# Patient Record
Sex: MALE | Race: WHITE | HISPANIC OR LATINO | Employment: FULL TIME | URBAN - METROPOLITAN AREA
[De-identification: names, ages, dates, MRNs, and addresses within clinical notes are randomized per-mention and may not be internally consistent; named-entity substitution may affect disease eponyms.]

---

## 2024-04-12 ENCOUNTER — APPOINTMENT (OUTPATIENT)
Dept: RADIOLOGY | Facility: MEDICAL CENTER | Age: 64
End: 2024-04-12
Attending: EMERGENCY MEDICINE
Payer: COMMERCIAL

## 2024-04-12 ENCOUNTER — HOSPITAL ENCOUNTER (OUTPATIENT)
Facility: MEDICAL CENTER | Age: 64
End: 2024-04-14
Attending: EMERGENCY MEDICINE | Admitting: INTERNAL MEDICINE
Payer: COMMERCIAL

## 2024-04-12 DIAGNOSIS — R41.0 DISORIENTATION: ICD-10-CM

## 2024-04-12 DIAGNOSIS — I10 UNCONTROLLED HYPERTENSION: ICD-10-CM

## 2024-04-12 DIAGNOSIS — I16.0 HYPERTENSIVE URGENCY: ICD-10-CM

## 2024-04-12 PROBLEM — R41.82 ALTERED MENTAL STATUS: Status: ACTIVE | Noted: 2024-04-12

## 2024-04-12 LAB
ALBUMIN SERPL BCP-MCNC: 4.8 G/DL (ref 3.2–4.9)
ALBUMIN/GLOB SERPL: 1.6 G/DL
ALP SERPL-CCNC: 91 U/L (ref 30–99)
ALT SERPL-CCNC: 23 U/L (ref 2–50)
AMMONIA PLAS-SCNC: 25 UMOL/L (ref 11–45)
ANION GAP SERPL CALC-SCNC: 16 MMOL/L (ref 7–16)
APPEARANCE UR: CLEAR
AST SERPL-CCNC: 32 U/L (ref 12–45)
BASOPHILS # BLD AUTO: 0.7 % (ref 0–1.8)
BASOPHILS # BLD: 0.08 K/UL (ref 0–0.12)
BILIRUB SERPL-MCNC: 0.7 MG/DL (ref 0.1–1.5)
BILIRUB UR QL STRIP.AUTO: NEGATIVE
BUN SERPL-MCNC: 10 MG/DL (ref 8–22)
CALCIUM ALBUM COR SERPL-MCNC: 8.7 MG/DL (ref 8.5–10.5)
CALCIUM SERPL-MCNC: 9.3 MG/DL (ref 8.5–10.5)
CHLORIDE SERPL-SCNC: 100 MMOL/L (ref 96–112)
CO2 SERPL-SCNC: 19 MMOL/L (ref 20–33)
COLOR UR: YELLOW
CREAT SERPL-MCNC: 0.9 MG/DL (ref 0.5–1.4)
EOSINOPHIL # BLD AUTO: 0.14 K/UL (ref 0–0.51)
EOSINOPHIL NFR BLD: 1.2 % (ref 0–6.9)
ERYTHROCYTE [DISTWIDTH] IN BLOOD BY AUTOMATED COUNT: 41.3 FL (ref 35.9–50)
ETHANOL BLD-MCNC: <10.1 MG/DL
ETHANOL BLD-MCNC: <10.1 MG/DL
GFR SERPLBLD CREATININE-BSD FMLA CKD-EPI: 95 ML/MIN/1.73 M 2
GLOBULIN SER CALC-MCNC: 3 G/DL (ref 1.9–3.5)
GLUCOSE BLD STRIP.AUTO-MCNC: 104 MG/DL (ref 65–99)
GLUCOSE SERPL-MCNC: 104 MG/DL (ref 65–99)
GLUCOSE UR STRIP.AUTO-MCNC: >=1000 MG/DL
HCT VFR BLD AUTO: 52.8 % (ref 42–52)
HGB BLD-MCNC: 18.1 G/DL (ref 14–18)
IMM GRANULOCYTES # BLD AUTO: 0.08 K/UL (ref 0–0.11)
IMM GRANULOCYTES NFR BLD AUTO: 0.7 % (ref 0–0.9)
KETONES UR STRIP.AUTO-MCNC: ABNORMAL MG/DL
LACTATE SERPL-SCNC: 2.4 MMOL/L (ref 0.5–2)
LEUKOCYTE ESTERASE UR QL STRIP.AUTO: NEGATIVE
LYMPHOCYTES # BLD AUTO: 2.75 K/UL (ref 1–4.8)
LYMPHOCYTES NFR BLD: 24.5 % (ref 22–41)
MCH RBC QN AUTO: 30.7 PG (ref 27–33)
MCHC RBC AUTO-ENTMCNC: 34.3 G/DL (ref 32.3–36.5)
MCV RBC AUTO: 89.6 FL (ref 81.4–97.8)
MICRO URNS: ABNORMAL
MONOCYTES # BLD AUTO: 0.67 K/UL (ref 0–0.85)
MONOCYTES NFR BLD AUTO: 6 % (ref 0–13.4)
NEUTROPHILS # BLD AUTO: 7.5 K/UL (ref 1.82–7.42)
NEUTROPHILS NFR BLD: 66.9 % (ref 44–72)
NITRITE UR QL STRIP.AUTO: NEGATIVE
NRBC # BLD AUTO: 0 K/UL
NRBC BLD-RTO: 0 /100 WBC (ref 0–0.2)
PH UR STRIP.AUTO: 6.5 [PH] (ref 5–8)
PLATELET # BLD AUTO: 246 K/UL (ref 164–446)
PMV BLD AUTO: 10 FL (ref 9–12.9)
POTASSIUM SERPL-SCNC: 4.3 MMOL/L (ref 3.6–5.5)
PROT SERPL-MCNC: 7.8 G/DL (ref 6–8.2)
PROT UR QL STRIP: NEGATIVE MG/DL
RBC # BLD AUTO: 5.89 M/UL (ref 4.7–6.1)
RBC UR QL AUTO: NEGATIVE
SODIUM SERPL-SCNC: 135 MMOL/L (ref 135–145)
SP GR UR STRIP.AUTO: 1.02
TROPONIN T SERPL-MCNC: 9 NG/L (ref 6–19)
TSH SERPL DL<=0.005 MIU/L-ACNC: 0.53 UIU/ML (ref 0.38–5.33)
UROBILINOGEN UR STRIP.AUTO-MCNC: 0.2 MG/DL
WBC # BLD AUTO: 11.2 K/UL (ref 4.8–10.8)

## 2024-04-12 PROCEDURE — 82962 GLUCOSE BLOOD TEST: CPT

## 2024-04-12 PROCEDURE — 83605 ASSAY OF LACTIC ACID: CPT

## 2024-04-12 PROCEDURE — 85025 COMPLETE CBC W/AUTO DIFF WBC: CPT

## 2024-04-12 PROCEDURE — 81003 URINALYSIS AUTO W/O SCOPE: CPT

## 2024-04-12 PROCEDURE — 71045 X-RAY EXAM CHEST 1 VIEW: CPT

## 2024-04-12 PROCEDURE — 99285 EMERGENCY DEPT VISIT HI MDM: CPT

## 2024-04-12 PROCEDURE — G0378 HOSPITAL OBSERVATION PER HR: HCPCS

## 2024-04-12 PROCEDURE — 82140 ASSAY OF AMMONIA: CPT

## 2024-04-12 PROCEDURE — 84484 ASSAY OF TROPONIN QUANT: CPT

## 2024-04-12 PROCEDURE — 36415 COLL VENOUS BLD VENIPUNCTURE: CPT

## 2024-04-12 PROCEDURE — 82077 ASSAY SPEC XCP UR&BREATH IA: CPT

## 2024-04-12 PROCEDURE — 80053 COMPREHEN METABOLIC PANEL: CPT

## 2024-04-12 PROCEDURE — 84443 ASSAY THYROID STIM HORMONE: CPT

## 2024-04-12 PROCEDURE — 70450 CT HEAD/BRAIN W/O DYE: CPT

## 2024-04-12 RX ORDER — ATORVASTATIN CALCIUM 40 MG/1
80 TABLET, FILM COATED ORAL DAILY
Status: DISCONTINUED | OUTPATIENT
Start: 2024-04-13 | End: 2024-04-14 | Stop reason: HOSPADM

## 2024-04-12 RX ORDER — TELMISARTAN 40 MG/1
20 TABLET ORAL
Status: DISCONTINUED | OUTPATIENT
Start: 2024-04-13 | End: 2024-04-13

## 2024-04-12 RX ORDER — HYDRALAZINE HYDROCHLORIDE 20 MG/ML
10 INJECTION INTRAMUSCULAR; INTRAVENOUS EVERY 6 HOURS PRN
Status: DISCONTINUED | OUTPATIENT
Start: 2024-04-12 | End: 2024-04-14 | Stop reason: HOSPADM

## 2024-04-12 ASSESSMENT — PATIENT HEALTH QUESTIONNAIRE - PHQ9
1. LITTLE INTEREST OR PLEASURE IN DOING THINGS: NOT AT ALL
2. FEELING DOWN, DEPRESSED, IRRITABLE, OR HOPELESS: NOT AT ALL
SUM OF ALL RESPONSES TO PHQ9 QUESTIONS 1 AND 2: 0

## 2024-04-12 ASSESSMENT — LIFESTYLE VARIABLES
TOTAL SCORE: 0
ALCOHOL_USE: NO
TOTAL SCORE: 0
CONSUMPTION TOTAL: NEGATIVE
EVER FELT BAD OR GUILTY ABOUT YOUR DRINKING: NO
AVERAGE NUMBER OF DAYS PER WEEK YOU HAVE A DRINK CONTAINING ALCOHOL: 0
ON A TYPICAL DAY WHEN YOU DRINK ALCOHOL HOW MANY DRINKS DO YOU HAVE: 0
TOTAL SCORE: 0
HOW MANY TIMES IN THE PAST YEAR HAVE YOU HAD 5 OR MORE DRINKS IN A DAY: 0
DOES PATIENT WANT TO STOP DRINKING: CANNOT ASSESS
EVER HAD A DRINK FIRST THING IN THE MORNING TO STEADY YOUR NERVES TO GET RID OF A HANGOVER: NO
HAVE YOU EVER FELT YOU SHOULD CUT DOWN ON YOUR DRINKING: NO
HAVE PEOPLE ANNOYED YOU BY CRITICIZING YOUR DRINKING: NO

## 2024-04-12 NOTE — ED TRIAGE NOTES
.  Chief Complaint   Patient presents with    Hypertension     Intermittent ha    Memory Loss     Pt A&O x 2     Ambulated to triage with s/o. Per wife pt was acting odd when she called him at 1237.  when wife left for work.  At triage pt is Alert oriented x person place. FSBS 104. Denies n/t no slurred speech flor+/=  Charge rn notified stroke assessment paged.

## 2024-04-12 NOTE — ED PROVIDER NOTES
ED Provider Note    Primary care provider: Eleuterio Rivas M.D.    CHIEF COMPLAINT  Chief Complaint   Patient presents with    Hypertension     Intermittent ha    Memory Loss     Pt A&O x 2       Additional history obtained from: Wife states that she last saw him normal at around 730 this morning, she went to work, she came back from work and noted at around 1230 he was confused.  She states that there was a  and carpet shampoo all over the bedroom as if he was going to clean the carpet but he does not recall this.  Limitation to History:  Select: Altered mental status / Confusion    HPI  Jeremias Carpenter is a 63 y.o. male who presents to the Emergency Department for acute confusion.  I was asked to evaluate the patient at the nurses station as he came in [facility] and they were concerned about CVA.  The patient can tell me that he is in [place], he knows his name and situation but cannot give the date, can give me the year.  Does not recall any events earlier today.  Denies any headache, neck pain, chest pain, denies any alcohol use, denies any new numbness, focal weakness or vision changes.      External Record Review: Patient was hospitalized in October for a carotid endarterectomy.  Patient followed up in the vascular surgery clinic in November was doing well after surgery.  Is on Farxiga 10 mg daily as well as Plavix.    REVIEW OF SYSTEMS  See HPI.     PAST MEDICAL HISTORY   has a past medical history of Hypertension.    SURGICAL HISTORY   has a past surgical history that includes thromboendartectmy neck,neck incis (Right, 10/25/2023).    SOCIAL HISTORY  Social History     Tobacco Use    Smoking status: Former     Types: Cigarettes     Start date: 2003     Quit date: 1999     Years since quittin.2    Smokeless tobacco: Never   Substance Use Topics    Alcohol use: Yes     Alcohol/week: 2.4 oz     Types: 4 Glasses of wine per week      Social History     Substance and Sexual Activity    Drug Use Not on file       FAMILY HISTORY  No family history on file.    CURRENT MEDICATIONS  Reviewed.  See Encounter Summary.     ALLERGIES  No Known Allergies    PHYSICAL EXAM  VITAL SIGNS: BP (!) 159/94   Pulse 95   Temp 37.1 °C (98.8 °F) (Temporal)   Resp 16   Wt 95.3 kg (210 lb 1.6 oz)   SpO2 98%   BMI 33.91 kg/m²   Constitutional: Awake, alert in no apparent distress.  HENT: Normocephalic, Bilateral external ears normal. Nose normal.   Eyes: Conjunctiva normal, non-icteric, EOMI.    Thorax & Lungs: Easy unlabored respirations, Clear to ascultation bilaterally.  Cardiovascular: Regular rate, Regular rhythm, No murmurs, rubs or gallops. Bilateral pulses symmetrical.   Abdomen:  Soft, nontender, nondistended, normal active bowel sounds.   :    Skin: Visualized skin is  Dry, No erythema, No rash.   Musculoskeletal:   No cyanosis, clubbing or edema. No leg asymmetry.   Neurologic: Alert, Grossly non-focal.  Cranial nerves II through XII intact, normal speech, finger-nose intact, no drift, no dysmetria.  The patient does have horizontal nystagmus   Psychiatric: Normal affect, Normal mood  Lymphatic:      Radiologist interpretation:   DX-CHEST-PORTABLE (1 VIEW)   Final Result      No acute cardiopulmonary disease evident.      CT-HEAD W/O   Final Result      1.  Head CT without contrast within normal limits. No evidence of acute cerebral infarction, hemorrhage or mass lesion.   2.  Old right orbital floor blowout fracture.             COURSE & MEDICAL DECISION MAKING  Pertinent Labs & Imaging studies reviewed. (See chart for details)    COURSE & MEDICAL DECISION MAKING  Pertinent Labs & Imaging studies reviewed. (See chart for details)    Differential diagnoses include but are not limited to: Concerning for TIA/CVA other consideration would be intoxication.    2:02 PM - Nursing notes reviewed, patient seen and examined at bedside.    3 PM patient still not at baseline, does not recall her prior  interactions.    3:45 PM patient stillConfused, family at bedside, will discuss with the hospitalist regarding admission.    Discussion of management with other medical personnel: Hospitalist, Dr. Kwong    Escalation of care considered, and ultimately not performed: Considered TNK.    Decision tools and prescription drugs considered including, but not limited to: NIH stroke scale 1    Decision Making:  This is a pleasant 63 y.o. year old male who presents with confusion.  Apparently this was fairly abrupt in onset, last known well around 730 this morning, the wife came home at noon to find him altered.  His NIH stroke scale is quite low at this time, his only deficit is confusion.  He is not a tPA candidate given last known well is greater than 4 hours, I did not feel that he would be a stroke IR candidate as his symptoms are too mild and would not be concerning for a large vessel occlusion.  Therefore he underwent noncontrast head CT that is unremarkable.  Patient's blood pressure was quite elevated, he had a prior history of carotid endarterectomy, I am still concerned given that he is not back to baseline that he may have had a small CVA.    He was given blood pressure medications in the ER with modest control of his blood pressure, will allow somewhat permissive hypertension for now, patient will be hospitalized for further evaluation, likely MRI.    CRITICAL CARE  The very real possibilty of a deterioration of this patient's condition required the highest level of my preparedness for sudden, emergent intervention.  I provided critical care services, which included medication orders, frequent reevaluations of the patient's condition and response to treatment, ordering and reviewing test results, and discussing the case with various consultants.  The critical care time associated with the care of the patient was 30 minutes. Review chart for interventions. This time is exclusive of any other billable procedures.            FINAL IMPRESSION  1. Disorientation    2. Uncontrolled hypertension

## 2024-04-13 ENCOUNTER — APPOINTMENT (OUTPATIENT)
Dept: RADIOLOGY | Facility: MEDICAL CENTER | Age: 64
End: 2024-04-13
Attending: STUDENT IN AN ORGANIZED HEALTH CARE EDUCATION/TRAINING PROGRAM
Payer: COMMERCIAL

## 2024-04-13 ENCOUNTER — APPOINTMENT (OUTPATIENT)
Dept: RADIOLOGY | Facility: MEDICAL CENTER | Age: 64
End: 2024-04-13
Payer: COMMERCIAL

## 2024-04-13 PROBLEM — I10 ESSENTIAL HYPERTENSION: Status: ACTIVE | Noted: 2024-04-13

## 2024-04-13 PROBLEM — I16.0 HYPERTENSIVE URGENCY: Status: ACTIVE | Noted: 2024-04-13

## 2024-04-13 PROBLEM — E78.5 HYPERLIPIDEMIA: Status: ACTIVE | Noted: 2024-04-13

## 2024-04-13 PROBLEM — E66.9 OBESITY: Status: ACTIVE | Noted: 2024-04-13

## 2024-04-13 PROCEDURE — A9579 GAD-BASE MR CONTRAST NOS,1ML: HCPCS | Mod: UD

## 2024-04-13 PROCEDURE — 70551 MRI BRAIN STEM W/O DYE: CPT

## 2024-04-13 PROCEDURE — 700117 HCHG RX CONTRAST REV CODE 255: Mod: UD

## 2024-04-13 PROCEDURE — G0378 HOSPITAL OBSERVATION PER HR: HCPCS

## 2024-04-13 PROCEDURE — A9270 NON-COVERED ITEM OR SERVICE: HCPCS | Mod: UD | Performed by: STUDENT IN AN ORGANIZED HEALTH CARE EDUCATION/TRAINING PROGRAM

## 2024-04-13 PROCEDURE — 99223 1ST HOSP IP/OBS HIGH 75: CPT | Performed by: INTERNAL MEDICINE

## 2024-04-13 PROCEDURE — 70552 MRI BRAIN STEM W/DYE: CPT

## 2024-04-13 PROCEDURE — 700102 HCHG RX REV CODE 250 W/ 637 OVERRIDE(OP): Mod: UD | Performed by: STUDENT IN AN ORGANIZED HEALTH CARE EDUCATION/TRAINING PROGRAM

## 2024-04-13 RX ORDER — TELMISARTAN 20 MG/1
40 TABLET ORAL
Qty: 60 TABLET | Refills: 0 | Status: SHIPPED | OUTPATIENT
Start: 2024-04-13 | End: 2024-04-14

## 2024-04-13 RX ORDER — TELMISARTAN 40 MG/1
40 TABLET ORAL
Status: DISCONTINUED | OUTPATIENT
Start: 2024-04-14 | End: 2024-04-14 | Stop reason: HOSPADM

## 2024-04-13 RX ADMIN — ATORVASTATIN CALCIUM 80 MG: 80 TABLET, FILM COATED ORAL at 05:52

## 2024-04-13 RX ADMIN — TELMISARTAN 20 MG: 40 TABLET ORAL at 05:52

## 2024-04-13 RX ADMIN — GADOTERIDOL 18 ML: 279.3 INJECTION, SOLUTION INTRAVENOUS at 21:55

## 2024-04-13 ASSESSMENT — FIBROSIS 4 INDEX: FIB4 SCORE: 1.71

## 2024-04-13 ASSESSMENT — PAIN DESCRIPTION - PAIN TYPE: TYPE: ACUTE PAIN

## 2024-04-13 NOTE — ED NOTES
Bedside report received from off going RN/tech: teagan OSCAR, assumed care of patient.  POC discussed with patient. Call light within reach, all needs addressed at this time.       Fall risk interventions in place: Move the patient closer to the nurse's station, Patient's personal possessions are with in their safe reach, and Keep floor surfaces clean and dry (all applicable per [place] Fall risk assessment)   Continuous monitoring: Not Applicable   IVF/IV medications: Not Applicable   Oxygen: Room Air  Bedside sitter: Not Applicable   Isolation: Not Applicable

## 2024-04-13 NOTE — CARE PLAN
The patient is Stable - Low risk of patient condition declining or worsening    Shift Goals  Clinical Goals: Brain MRI, q4h neuro checks will remain stable  Patient Goals: Rest  Family Goals: No family currently present    Progress made toward(s) clinical / shift goals:    Q4h neuro checks remain stable, no deficits noted.  Problem: Knowledge Deficit - Standard  Goal: Patient and family/care givers will demonstrate understanding of plan of care, disease process/condition, diagnostic tests and medications  Outcome: Progressing  Patient updated on POC for brain MRI today. Per MRI tech, time has yet to be determined. MRI screening complete. Patient's wife updated via phone.

## 2024-04-13 NOTE — H&P
Hospital Medicine History & Physical Note    Date of Service  4/13/2024    Primary Care Physician  Eleuterio Rivas M.D.    Consultants  None    Code Status  Full Code    Chief Complaint  Chief Complaint   Patient presents with    Hypertension     Intermittent ha    Memory Loss     Pt A&O x 2       History of Presenting Illness  Jeremias Carpenter is a 63 y.o. male with hypertension, history of carotid artery stenosis s/p endarterectomy (October 2023), who presents with acute confusion.  He was last seen normal by his wife in the morning before she went to work, and found him confused at home with note of  and carpet shampoo all over the bedroom as if he was going to clean the carpet although patient could not recall this.  Patient could not recall any events earlier in the day, but later on started to remember that he dropped his kids in school and when he came back home he was feeling weird.  He had no other complaints such as focal weakness, numbness, speech or vision changes.  He denies any chest pain, shortness of breath, nausea, vomiting, abdominal pain.  He was then brought to the ED.    Notably, he states that his blood pressure has been running high to the 150s and had to take extra dose of his telmisartan.  He was changed from a different blood pressure medications about 4 to 5 months ago by his PCP.     ED course:  On evaluation, his blood pressure was significantly elevated at 189/124.  Head CT showed nothing acute, with note of old right orbital floor blowout fracture.  Initial blood workup showed WBC of 11,200, hemoglobin of 18.1, normal platelet count, normal electrolytes and renal function, and normal LFTs.  Ammonia level was normal.  Lactate was 2.4.  Blood alcohol level was low.  Troponin was negative.  Urinalysis was clean.  TSH was normal.  Chest x-ray showed nothing acute (personally reviewed).  Patient subsequently admitted to the hospitalist service.    I personally  reviewed all lab results mentioned above. Prior medical records from this institution and outside facilities were independently reviewed as noted. I also personally reviewed all ER physician and consultant recommendations and plans as documented above. History was independently obtained by myself. I discussed the plan of care with patient, family, bedside RN, charge RN, , and ERP .    Review of Systems  ROS    Pertinent positives/negatives as mentioned above.     A complete review of systems was personally done by me. All other systems were negative.       Past Medical History   has a past medical history of Hypertension.    Surgical History   has a past surgical history that includes pr thromboendartectmy neck,neck incis (Right, 10/25/2023).     Family History  Family history reviewed with patient. There is no family history that is pertinent to the chief complaint.     Social History   reports that he quit smoking about 25 years ago. His smoking use included cigarettes. He started smoking about 21 years ago. He has never used smokeless tobacco. He reports current alcohol use of about 2.4 oz of alcohol per week.    Allergies  No Known Allergies    Medications  Prior to Admission Medications   Prescriptions Last Dose Informant Patient Reported? Taking?   Ascorbic Acid (VITAMIN C PO) unk at unk  Yes No   Sig: Take 1 Tablet by mouth every day.   FARXIGA 10 MG Tab unk at unk  Yes No   Sig: Take 10 mg by mouth every day.   MILK THISTLE PO unk at unk  Yes No   Sig: Take 1 Tablet by mouth every day.   Omega-3 Fatty Acids (OMEGA 3 PO) unk at unk  Yes No   Sig: Take 1 Capsule by mouth every day.   VITAMIN D PO unk at unk  Yes No   Sig: Take 1 Tablet by mouth every day.   atorvastatin (LIPITOR) 80 MG tablet unk at unk  Yes No   Sig: Take 80 mg by mouth every day.   telmisartan (MICARDIS) 20 MG tablet 4/12/2024 at unk  Yes Yes   Sig: Take 20 mg by mouth every day.      Facility-Administered Medications: None        Physical Exam  Temp:  [36.3 °C (97.3 °F)-37.1 °C (98.8 °F)] 36.3 °C (97.3 °F)  Pulse:  [] 70  Resp:  [16] 16  BP: (144-189)/() 145/88  SpO2:  [95 %-99 %] 96 %  Blood Pressure: (!) 145/88   Temperature: 36.3 °C (97.3 °F)   Pulse: 70   Respiration: 16   Pulse Oximetry: 96 %       Physical Exam  Vitals reviewed.   Constitutional:       General: He is not in acute distress.     Appearance: Normal appearance. He is obese. He is not toxic-appearing or diaphoretic.      Comments: Body mass index is 33.91 kg/m².   HENT:      Head: Normocephalic and atraumatic.      Right Ear: External ear normal.      Left Ear: External ear normal.      Mouth/Throat:      Mouth: Mucous membranes are moist.      Pharynx: No oropharyngeal exudate.   Eyes:      General: No scleral icterus.     Extraocular Movements: Extraocular movements intact.      Conjunctiva/sclera: Conjunctivae normal.      Pupils: Pupils are equal, round, and reactive to light.   Cardiovascular:      Rate and Rhythm: Normal rate and regular rhythm.      Heart sounds: Normal heart sounds. No murmur heard.     No gallop.   Pulmonary:      Effort: Pulmonary effort is normal. No respiratory distress.      Breath sounds: Normal breath sounds. No stridor. No wheezing, rhonchi or rales.   Chest:      Chest wall: No tenderness.   Abdominal:      General: Bowel sounds are normal. There is no distension.      Palpations: Abdomen is soft. There is no mass.      Tenderness: There is no abdominal tenderness. There is no guarding or rebound.   Musculoskeletal:         General: No swelling. Normal range of motion.      Cervical back: Normal range of motion and neck supple.      Right lower leg: No edema.      Left lower leg: No edema.   Lymphadenopathy:      Cervical: No cervical adenopathy.   Skin:     General: Skin is warm and dry.      Coloration: Skin is not jaundiced.      Findings: No rash.   Neurological:      General: No focal deficit present.      Mental  "Status: He is alert and oriented to person, place, and time.      Cranial Nerves: No cranial nerve deficit.   Psychiatric:         Mood and Affect: Mood normal.         Behavior: Behavior normal.         Thought Content: Thought content normal.         Judgment: Judgment normal.         Laboratory:  Recent Labs     04/12/24  1415   WBC 11.2*   RBC 5.89   HEMOGLOBIN 18.1*   HEMATOCRIT 52.8*   MCV 89.6   MCH 30.7   MCHC 34.3   RDW 41.3   PLATELETCT 246   MPV 10.0     Recent Labs     04/12/24  1630   SODIUM 135   POTASSIUM 4.3   CHLORIDE 100   CO2 19*   GLUCOSE 104*   BUN 10   CREATININE 0.90   CALCIUM 9.3     Recent Labs     04/12/24  1630   ALTSGPT 23   ASTSGOT 32   ALKPHOSPHAT 91   TBILIRUBIN 0.7   GLUCOSE 104*         No results for input(s): \"NTPROBNP\" in the last 72 hours.      Recent Labs     04/12/24  1630   TROPONINT 9       Imaging:  DX-CHEST-PORTABLE (1 VIEW)   Final Result      No acute cardiopulmonary disease evident.      CT-HEAD W/O   Final Result      1.  Head CT without contrast within normal limits. No evidence of acute cerebral infarction, hemorrhage or mass lesion.   2.  Old right orbital floor blowout fracture.         MR-BRAIN-W/O    (Results Pending)         Imaging studies and EKG results were independently reviewed as above.      Assessment/Plan:  Justification for Admission Status  I anticipate this patient is appropriate for observation status at this time because AMS/hypertensive urgency/emergency requiring further workup and optimization of blood pressure control.      * Altered mental status- (present on admission)  Assessment & Plan  - Suspect this is due to hypertensive urgency/emergency versus transient global amnesia.  No focal neurologic deficits.  -Admit to the CDU.  -Will further workup with brain MRI.  Neurochecks per protocol.  -Will control blood pressure with adjustment in blood pressure medications.  Start as needed IV hydralazine.  -Monitor on telemetry.    Hypertensive " urgency- (present on admission)  Assessment & Plan  - Likely cause of acute encephalopathy.  Blood pressure significantly elevated on presentation.  -Increase telmisartan to 40 mg daily. Monitor blood pressure trend closely, start as needed IV hydralazine for significant symptomatic hypertension.  Optimize blood pressure control.  -Will obtain brain MRI to rule out acute pathology.    Obesity- (present on admission)  Assessment & Plan  - Body mass index is 33.91 kg/m²..  -  on weight loss.  - outpatient referral for outpatient weight management.    Hyperlipidemia- (present on admission)  Assessment & Plan  - Resume Lipitor.    Essential hypertension- (present on admission)  Assessment & Plan  - Management as above.         VTE prophylaxis: SCDs/TEDs

## 2024-04-13 NOTE — ASSESSMENT & PLAN NOTE
- Likely cause of acute encephalopathy.  Blood pressure significantly elevated on presentation.  -Increase telmisartan to 40 mg daily. Monitor blood pressure trend closely, start as needed IV hydralazine for significant symptomatic hypertension.  Optimize blood pressure control.  -Will obtain brain MRI to rule out acute pathology.

## 2024-04-13 NOTE — PROGRESS NOTES
Received report from night shift RN. Assumed patient care at 0715. Assessment completed. A&OX4. Pain 0/10.  Patient ambulates up self with a steady gait. Patient updated on plan of care for Q4h neuro checks and brain MRI today. MRI screening form complete.    Bed in lowest position, bed locked, call light within reach. Hourly rounding in place.

## 2024-04-13 NOTE — CARE PLAN
The patient is Stable - Low risk of patient condition declining or worsening    Shift Goals  Clinical Goals: MRI head  Patient Goals: rest      Problem: Knowledge Deficit - Standard  Goal: Patient and family/care givers will demonstrate understanding of plan of care, disease process/condition, diagnostic tests and medications  Outcome: Progressing

## 2024-04-13 NOTE — ASSESSMENT & PLAN NOTE
- Suspect this is due to hypertensive urgency/emergency versus transient global amnesia.  No focal neurologic deficits.  -Admit to the CDU.  -Will further workup with brain MRI.  Neurochecks per protocol.  -Will control blood pressure with adjustment in blood pressure medications.  Start as needed IV hydralazine.  -Monitor on telemetry.

## 2024-04-13 NOTE — ED NOTES
Pt being taken upstairs at this time by transport team via gurney. Pt is awake and alert, talking to staff, in no apparent distress at time of transfer. Pt's paperwork and belongings sent upstairs with pt and family.

## 2024-04-14 ENCOUNTER — PHARMACY VISIT (OUTPATIENT)
Dept: PHARMACY | Facility: MEDICAL CENTER | Age: 64
End: 2024-04-14
Payer: COMMERCIAL

## 2024-04-14 VITALS
BODY MASS INDEX: 33.95 KG/M2 | RESPIRATION RATE: 17 BRPM | HEART RATE: 70 BPM | WEIGHT: 210.32 LBS | OXYGEN SATURATION: 97 % | TEMPERATURE: 96.6 F | SYSTOLIC BLOOD PRESSURE: 119 MMHG | DIASTOLIC BLOOD PRESSURE: 71 MMHG

## 2024-04-14 PROBLEM — I16.0 HYPERTENSIVE URGENCY: Status: RESOLVED | Noted: 2024-04-13 | Resolved: 2024-04-14

## 2024-04-14 PROBLEM — R41.82 ALTERED MENTAL STATUS: Status: RESOLVED | Noted: 2024-04-12 | Resolved: 2024-04-14

## 2024-04-14 PROCEDURE — 700102 HCHG RX REV CODE 250 W/ 637 OVERRIDE(OP): Mod: UD | Performed by: STUDENT IN AN ORGANIZED HEALTH CARE EDUCATION/TRAINING PROGRAM

## 2024-04-14 PROCEDURE — G0378 HOSPITAL OBSERVATION PER HR: HCPCS

## 2024-04-14 PROCEDURE — 99239 HOSP IP/OBS DSCHRG MGMT >30: CPT | Performed by: STUDENT IN AN ORGANIZED HEALTH CARE EDUCATION/TRAINING PROGRAM

## 2024-04-14 PROCEDURE — A9270 NON-COVERED ITEM OR SERVICE: HCPCS | Mod: UD | Performed by: INTERNAL MEDICINE

## 2024-04-14 PROCEDURE — RXMED WILLOW AMBULATORY MEDICATION CHARGE: Performed by: STUDENT IN AN ORGANIZED HEALTH CARE EDUCATION/TRAINING PROGRAM

## 2024-04-14 PROCEDURE — 700102 HCHG RX REV CODE 250 W/ 637 OVERRIDE(OP): Mod: UD | Performed by: INTERNAL MEDICINE

## 2024-04-14 PROCEDURE — A9270 NON-COVERED ITEM OR SERVICE: HCPCS | Mod: UD | Performed by: STUDENT IN AN ORGANIZED HEALTH CARE EDUCATION/TRAINING PROGRAM

## 2024-04-14 RX ORDER — TELMISARTAN 40 MG/1
40 TABLET ORAL
Qty: 30 TABLET | Refills: 0 | Status: SHIPPED | OUTPATIENT
Start: 2024-04-14

## 2024-04-14 RX ADMIN — TELMISARTAN 40 MG: 40 TABLET ORAL at 05:29

## 2024-04-14 RX ADMIN — ATORVASTATIN CALCIUM 80 MG: 80 TABLET, FILM COATED ORAL at 05:29

## 2024-04-14 NOTE — DISCHARGE SUMMARY
Discharge Summary    CHIEF COMPLAINT ON ADMISSION  Chief Complaint   Patient presents with    Hypertension     Intermittent ha    Memory Loss     Pt A&O x 2       Reason for Admission  High blood pressure, memory lapse     Admission Date  4/12/2024    CODE STATUS  Prior    HPI & HOSPITAL COURSE    Raulito Carpenter is a 63 yr old male with PMHx HTN, right carotid artery stenosis s/p endarterectomy 10/2023.  Admitted 4/15 for confusion.    On admission his blood pressures were elevated with an SBP ranging 1 .  He was tachycardic.  Lab work notable for WBC elevated 11.2.  BMP was unremarkable.  Troponin 9.  UA without signs of infection.    CT Head: Old right orbital floor blowout fracture.  Otherwise within normal limits  CXR no acute cardio pulmonary processes present  MRI brain without: Small cystic area of right temporal occipital region evidence of prior hemorrhage.  Sequelae of remote trauma.  Recommending MRI with contrast to rule out malignancy.    MRI brain with: No ring-enhancing.  Favoring sequelae of remote trauma.    Patient's initial altered mental status cleared at time of discharge.  Believed to be secondary to hypertensive urgency.  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  This would explain the orbital blowout fracture and sequelae of remote trauma.    Patient states his blood pressure medications were recently lowered.  Patient will start taking telmisartan 40 mg daily.  Medication was delivered to bedside prior to discharge.  He will follow-up with his primary care physician this week.    Therefore, he is discharged in good and stable condition to home with close outpatient follow-up.    The patient recovered much more quickly than anticipated on admission.    Discharge Date  4/14/2024    FOLLOW UP ITEMS POST DISCHARGE  Follow-up with primary care physician 1 week    DISCHARGE DIAGNOSES  Principal Problem (Resolved):    Altered mental status (POA: Yes)  Active  Problems:    Essential hypertension (POA: Yes)    Hyperlipidemia (POA: Yes)    Obesity (POA: Yes)  Resolved Problems:    Hypertensive urgency (POA: Yes)      FOLLOW UP  Future Appointments   Date Time Provider Department Center   5/1/2024 10:45 AM [facility] EXAM 6 [facility]     Eleuterio Rivas M.D.  [address]  [place] [address]  [phone]    Follow up in 3 day(s)        MEDICATIONS ON DISCHARGE     Medication List        CHANGE how you take these medications        Instructions   telmisartan 40 MG Tabs  What changed:   medication strength  how much to take  Commonly known as: Micardis   Take 1 Tablet by mouth every day.  (Take 1 Tablet by mouth every day.)  Dose: 40 mg            CONTINUE taking these medications        Instructions   atorvastatin 80 MG tablet  Commonly known as: Lipitor   Take 80 mg by mouth every day.  Dose: 80 mg     Farxiga 10 MG Tabs  Generic drug: dapagliflozin propanediol   Take 10 mg by mouth every day.  Dose: 10 mg     MILK THISTLE PO   Take 1 Tablet by mouth every day.  Dose: 1 Tablet     OMEGA 3 PO   Take 1 Capsule by mouth every day.  Dose: 1 Capsule     VITAMIN C PO   Take 1 Tablet by mouth every day.  Dose: 1 Tablet     VITAMIN D PO   Take 1 Tablet by mouth every day.  Dose: 1 Tablet              Allergies  No Known Allergies    DIET  No orders of the defined types were placed in this encounter.      ACTIVITY  As tolerated.  Weight bearing as tolerated    CONSULTATIONS  None     PROCEDURES  None     LABORATORY  Lab Results   Component Value Date    SODIUM 135 04/12/2024    POTASSIUM 4.3 04/12/2024    CHLORIDE 100 04/12/2024    CO2 19 (L) 04/12/2024    GLUCOSE 104 (H) 04/12/2024    BUN 10 04/12/2024    CREATININE 0.90 04/12/2024        Lab Results   Component Value Date    WBC 11.2 (H) 04/12/2024    HEMOGLOBIN 18.1 (H) 04/12/2024    HEMATOCRIT 52.8 (H) 04/12/2024    PLATELETCT 246 04/12/2024        Total time of the discharge process exceeds 38 minutes.

## 2024-04-14 NOTE — CARE PLAN
The patient is Stable - Low risk of patient condition declining or worsening    Shift Goals  Clinical Goals: Patient safety/Neuro check Q4  Patient Goals: Rest  Family Goals: MORGAN    Progress made toward(s) clinical / shift goals:  Patient denies any pain, MRI last night ,pending results. Neuro checks Q4 hours. Able to rest and sleep comfortably, call light in reach.     Patient is not progressing towards the following goals:

## 2024-04-14 NOTE — PROGRESS NOTES
4 Eyes Skin Assessment Completed by Sondra RN and Trinity RN.    Head WDL  Ears WDL  Nose WDL  Mouth WDL  Neck WDL  Breast/Chest WDL  Shoulder Blades WDL  Spine WDL  (R) Arm/Elbow/Hand WDL  (L) Arm/Elbow/Hand WDL  Abdomen WDL  Groin WDL  Scrotum/Coccyx/Buttocks Redness,blanching  (R) Leg WDL  (L) Leg WDL  (R) Heel/Foot/Toe Redness and Blanching  (L) Heel/Foot/Toe Redness and Blanching          Devices In Places Blood Pressure Cuff      Interventions In Place Pillows    Possible Skin Injury No    Pictures Uploaded Into Epic N/A  Wound Consult Placed N/A  RN Wound Prevention Protocol Ordered No

## 2024-04-14 NOTE — PROGRESS NOTES
Patient off unit to MRI. S6 Charge RN Yanci notified of transfer directly after MRI is performed. Report given to CELESTINA Barker.

## 2024-04-14 NOTE — PROGRESS NOTES
Discharge possible pending MRI results. Film room notified. Escalated to radiologist by NP. Per NP results to be read tonight. Patient updated.

## 2024-04-14 NOTE — PROGRESS NOTES
Pt discharged per order, IV removed prior to Discharge. All belonging with patient and discharge orders and summary reviewed with patient.

## 2024-04-15 NOTE — HOSPITAL COURSE
Raulito Carpenter is a 63 yr old male with PMHx HTN, right carotid artery stenosis s/p endarterectomy 10/2023.  Admitted 4/15 for confusion.    On admission his blood pressures were elevated with an SBP ranging 1 .  He was tachycardic.  Lab work notable for WBC elevated 11.2.  BMP was unremarkable.  Troponin 9.  UA without signs of infection.    CT Head: Old right orbital floor blowout fracture.  Otherwise within normal limits  CXR no acute cardio pulmonary processes present  MRI brain without: Small cystic area of right temporal occipital region evidence of prior hemorrhage.  Sequelae of remote trauma.  Recommending MRI with contrast to rule out malignancy.    MRI brain with: No ring-enhancing.  Favoring sequelae of remote trauma.    Patient's initial altered mental status cleared at time of discharge.  Believed to be secondary to hypertensive urgency.  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  This would explain the orbital blowout fracture and sequelae of remote trauma.    Patient states his blood pressure medications were recently lowered.  Patient will start taking telmisartan 40 mg daily.  Medication was delivered to bedside prior to discharge.  He will follow-up with his primary care physician this week.

## 2024-05-01 ENCOUNTER — HOSPITAL ENCOUNTER (OUTPATIENT)
Dept: RADIOLOGY | Facility: MEDICAL CENTER | Age: 64
End: 2024-05-01
Attending: SURGERY
Payer: COMMERCIAL

## 2024-05-01 DIAGNOSIS — I65.21 CAROTID ARTERY STENOSIS, ASYMPTOMATIC, RIGHT: ICD-10-CM

## 2024-05-30 ENCOUNTER — OFFICE VISIT (OUTPATIENT)
Dept: VASCULAR SURGERY | Facility: MEDICAL CENTER | Age: 64
End: 2024-05-30
Payer: COMMERCIAL

## 2024-05-30 VITALS
SYSTOLIC BLOOD PRESSURE: 126 MMHG | BODY MASS INDEX: 33.75 KG/M2 | WEIGHT: 210 LBS | HEIGHT: 66 IN | HEART RATE: 100 BPM | OXYGEN SATURATION: 97 % | TEMPERATURE: 98.5 F | DIASTOLIC BLOOD PRESSURE: 64 MMHG

## 2024-05-30 DIAGNOSIS — I65.21 CAROTID ARTERY STENOSIS, ASYMPTOMATIC, RIGHT: ICD-10-CM

## 2024-05-30 RX ORDER — TELMISARTAN 80 MG/1
TABLET ORAL
COMMUNITY
Start: 2024-04-16

## 2024-05-30 ASSESSMENT — FIBROSIS 4 INDEX: FIB4 SCORE: 1.74

## 2024-05-30 NOTE — PROGRESS NOTES
"                 VASCULAR SURGERY                 Clinic Progress Note  _________________________________    Vitals for Today's Visit (5/30/2024)  /64 (BP Location: Left arm, Patient Position: Sitting, BP Cuff Size: Adult)   Pulse 100   Temp 36.9 °C (98.5 °F) (Temporal)   Ht 1.676 m (5' 6\")   Wt 95.3 kg (210 lb)   SpO2 97%   BMI 33.89 kg/m²   _________________________________          5/30/2024  This patient returns to my office to review the results of his surveillance carotid duplex.  As you recall the patient underwent right carotid endarterectomy for symptomatic carotid artery stenosis in the past.  This represents his 6-month follow-up duplex.  That duplex demonstrates no evidence of recurrent stenosis with minimal contralateral disease.        Carotid artery stenosis status post right sided endarterectomy.  Surveillance duplex looks good  Patient remains asymptomatic    Will follow-up with a duplex in 1 year.        Ever Scott MD  [facility]  [phone]  [address], [place] [address]  "

## 2024-07-10 PROCEDURE — 93005 ELECTROCARDIOGRAM TRACING: CPT

## 2024-07-10 PROCEDURE — 99285 EMERGENCY DEPT VISIT HI MDM: CPT

## 2024-07-10 PROCEDURE — 93005 ELECTROCARDIOGRAM TRACING: CPT | Performed by: EMERGENCY MEDICINE

## 2024-07-11 ENCOUNTER — APPOINTMENT (OUTPATIENT)
Dept: RADIOLOGY | Facility: MEDICAL CENTER | Age: 64
End: 2024-07-11
Attending: EMERGENCY MEDICINE
Payer: COMMERCIAL

## 2024-07-11 ENCOUNTER — HOSPITAL ENCOUNTER (EMERGENCY)
Facility: MEDICAL CENTER | Age: 64
End: 2024-07-11
Attending: EMERGENCY MEDICINE
Payer: COMMERCIAL

## 2024-07-11 VITALS
SYSTOLIC BLOOD PRESSURE: 175 MMHG | OXYGEN SATURATION: 92 % | BODY MASS INDEX: 32.14 KG/M2 | HEART RATE: 93 BPM | HEIGHT: 68 IN | TEMPERATURE: 98.5 F | WEIGHT: 212.08 LBS | RESPIRATION RATE: 16 BRPM | DIASTOLIC BLOOD PRESSURE: 92 MMHG

## 2024-07-11 DIAGNOSIS — R07.9 CHEST PAIN, UNSPECIFIED TYPE: ICD-10-CM

## 2024-07-11 DIAGNOSIS — I10 HYPERTENSION, UNSPECIFIED TYPE: ICD-10-CM

## 2024-07-11 LAB
ALBUMIN SERPL BCP-MCNC: 4.5 G/DL (ref 3.2–4.9)
ALBUMIN/GLOB SERPL: 1.6 G/DL
ALP SERPL-CCNC: 79 U/L (ref 30–99)
ALT SERPL-CCNC: 24 U/L (ref 2–50)
ANION GAP SERPL CALC-SCNC: 18 MMOL/L (ref 7–16)
AST SERPL-CCNC: 28 U/L (ref 12–45)
BASOPHILS # BLD AUTO: 0.5 % (ref 0–1.8)
BASOPHILS # BLD: 0.06 K/UL (ref 0–0.12)
BILIRUB SERPL-MCNC: 0.9 MG/DL (ref 0.1–1.5)
BUN SERPL-MCNC: 11 MG/DL (ref 8–22)
CALCIUM ALBUM COR SERPL-MCNC: 8.6 MG/DL (ref 8.5–10.5)
CALCIUM SERPL-MCNC: 9 MG/DL (ref 8.5–10.5)
CHLORIDE SERPL-SCNC: 102 MMOL/L (ref 96–112)
CO2 SERPL-SCNC: 16 MMOL/L (ref 20–33)
CREAT SERPL-MCNC: 1.02 MG/DL (ref 0.5–1.4)
EKG IMPRESSION: NORMAL
EOSINOPHIL # BLD AUTO: 0.11 K/UL (ref 0–0.51)
EOSINOPHIL NFR BLD: 0.9 % (ref 0–6.9)
ERYTHROCYTE [DISTWIDTH] IN BLOOD BY AUTOMATED COUNT: 41.7 FL (ref 35.9–50)
GFR SERPLBLD CREATININE-BSD FMLA CKD-EPI: 82 ML/MIN/1.73 M 2
GLOBULIN SER CALC-MCNC: 2.9 G/DL (ref 1.9–3.5)
GLUCOSE SERPL-MCNC: 107 MG/DL (ref 65–99)
HCT VFR BLD AUTO: 46.7 % (ref 42–52)
HGB BLD-MCNC: 16.4 G/DL (ref 14–18)
IMM GRANULOCYTES # BLD AUTO: 0.04 K/UL (ref 0–0.11)
IMM GRANULOCYTES NFR BLD AUTO: 0.3 % (ref 0–0.9)
LYMPHOCYTES # BLD AUTO: 2.49 K/UL (ref 1–4.8)
LYMPHOCYTES NFR BLD: 21.5 % (ref 22–41)
MCH RBC QN AUTO: 31.4 PG (ref 27–33)
MCHC RBC AUTO-ENTMCNC: 35.1 G/DL (ref 32.3–36.5)
MCV RBC AUTO: 89.3 FL (ref 81.4–97.8)
MONOCYTES # BLD AUTO: 0.8 K/UL (ref 0–0.85)
MONOCYTES NFR BLD AUTO: 6.9 % (ref 0–13.4)
NEUTROPHILS # BLD AUTO: 8.09 K/UL (ref 1.82–7.42)
NEUTROPHILS NFR BLD: 69.9 % (ref 44–72)
NRBC # BLD AUTO: 0 K/UL
NRBC BLD-RTO: 0 /100 WBC (ref 0–0.2)
PLATELET # BLD AUTO: 258 K/UL (ref 164–446)
PMV BLD AUTO: 9.6 FL (ref 9–12.9)
POTASSIUM SERPL-SCNC: 3.8 MMOL/L (ref 3.6–5.5)
PROT SERPL-MCNC: 7.4 G/DL (ref 6–8.2)
RBC # BLD AUTO: 5.23 M/UL (ref 4.7–6.1)
SODIUM SERPL-SCNC: 136 MMOL/L (ref 135–145)
TROPONIN T SERPL-MCNC: 8 NG/L (ref 6–19)
TROPONIN T SERPL-MCNC: 9 NG/L (ref 6–19)
WBC # BLD AUTO: 11.6 K/UL (ref 4.8–10.8)

## 2024-07-11 PROCEDURE — 36415 COLL VENOUS BLD VENIPUNCTURE: CPT

## 2024-07-11 PROCEDURE — 71045 X-RAY EXAM CHEST 1 VIEW: CPT

## 2024-07-11 PROCEDURE — 84484 ASSAY OF TROPONIN QUANT: CPT | Mod: 91

## 2024-07-11 PROCEDURE — 96374 THER/PROPH/DIAG INJ IV PUSH: CPT

## 2024-07-11 PROCEDURE — 80053 COMPREHEN METABOLIC PANEL: CPT

## 2024-07-11 PROCEDURE — 85025 COMPLETE CBC W/AUTO DIFF WBC: CPT

## 2024-07-11 PROCEDURE — 700111 HCHG RX REV CODE 636 W/ 250 OVERRIDE (IP): Mod: UD | Performed by: EMERGENCY MEDICINE

## 2024-07-11 RX ORDER — LABETALOL HYDROCHLORIDE 5 MG/ML
10 INJECTION, SOLUTION INTRAVENOUS ONCE
Status: COMPLETED | OUTPATIENT
Start: 2024-07-11 | End: 2024-07-11

## 2024-07-11 RX ADMIN — LABETALOL HYDROCHLORIDE 10 MG: 5 INJECTION, SOLUTION INTRAVENOUS at 02:44

## 2024-07-11 ASSESSMENT — HEART SCORE
TROPONIN: LESS THAN OR EQUAL TO NORMAL LIMIT
ECG: NORMAL
RISK FACTORS: >2 RISK FACTORS OR HX OF ATHEROSCLEROTIC DISEASE
HEART SCORE: 3
HISTORY: SLIGHTLY SUSPICIOUS
AGE: 45-64

## 2024-07-11 ASSESSMENT — PAIN DESCRIPTION - PAIN TYPE: TYPE: ACUTE PAIN

## 2024-07-11 ASSESSMENT — FIBROSIS 4 INDEX: FIB4 SCORE: 1.74

## 2025-05-07 ENCOUNTER — HOSPITAL ENCOUNTER (OUTPATIENT)
Dept: RADIOLOGY | Facility: MEDICAL CENTER | Age: 65
End: 2025-05-07
Attending: SURGERY
Payer: COMMERCIAL

## 2025-05-07 DIAGNOSIS — I65.21 CAROTID ARTERY STENOSIS, ASYMPTOMATIC, RIGHT: ICD-10-CM

## 2025-05-07 PROCEDURE — 93880 EXTRACRANIAL BILAT STUDY: CPT

## 2025-06-12 ENCOUNTER — OFFICE VISIT (OUTPATIENT)
Facility: MEDICAL CENTER | Age: 65
End: 2025-06-12
Payer: COMMERCIAL

## 2025-06-12 VITALS
HEART RATE: 80 BPM | SYSTOLIC BLOOD PRESSURE: 144 MMHG | BODY MASS INDEX: 31.74 KG/M2 | TEMPERATURE: 98.3 F | DIASTOLIC BLOOD PRESSURE: 82 MMHG | WEIGHT: 209.44 LBS | HEIGHT: 68 IN | OXYGEN SATURATION: 98 %

## 2025-06-12 DIAGNOSIS — I65.21 CAROTID ARTERY STENOSIS, ASYMPTOMATIC, RIGHT: Primary | ICD-10-CM

## 2025-06-12 PROCEDURE — 99212 OFFICE O/P EST SF 10 MIN: CPT | Performed by: SURGERY

## 2025-06-12 PROCEDURE — 3079F DIAST BP 80-89 MM HG: CPT | Performed by: SURGERY

## 2025-06-12 PROCEDURE — 3077F SYST BP >= 140 MM HG: CPT | Performed by: SURGERY

## 2025-06-12 ASSESSMENT — FIBROSIS 4 INDEX: FIB4 SCORE: 1.44

## 2025-06-12 NOTE — PROGRESS NOTES
"                 VASCULAR SURGERY                 Clinic Progress Note  _________________________________    Vitals for Today's Visit  BP (!) 144/82 (BP Location: Left arm, Patient Position: Sitting, BP Cuff Size: Adult)   Pulse 80   Temp 36.8 °C (98.3 °F) (Temporal)   Ht 1.727 m (5' 8\")   Wt 95 kg (209 lb 7 oz)   SpO2 98%   BMI 31.84 kg/m²   _________________________________          6/12/2025    This patient returns to my office to review the results of his carotid duplex.  As you recall he underwent a right carotid endarterectomy back in October 2023 for a symptomatic right carotid artery lesion.  The patient's procedure was performed without complication.    His most recent carotid duplex demonstrates widely patent carotid arteries bilaterally.    Impression-     Carotid artery disease status post right carotid endarterectomy  Most recent carotid duplex looks good    Follow-up 1 year carotid duplex        Ever Scott MD  [facility]  [phone]  [address], [place] [address]  "

## 2025-08-12 SDOH — ECONOMIC STABILITY: FOOD INSECURITY: WITHIN THE PAST 12 MONTHS, YOU WORRIED THAT YOUR FOOD WOULD RUN OUT BEFORE YOU GOT MONEY TO BUY MORE.: NEVER TRUE

## 2025-08-12 SDOH — HEALTH STABILITY: PHYSICAL HEALTH: ON AVERAGE, HOW MANY MINUTES DO YOU ENGAGE IN EXERCISE AT THIS LEVEL?: 30 MIN

## 2025-08-12 SDOH — ECONOMIC STABILITY: INCOME INSECURITY: IN THE LAST 12 MONTHS, WAS THERE A TIME WHEN YOU WERE NOT ABLE TO PAY THE MORTGAGE OR RENT ON TIME?: NO

## 2025-08-12 SDOH — HEALTH STABILITY: PHYSICAL HEALTH: ON AVERAGE, HOW MANY DAYS PER WEEK DO YOU ENGAGE IN MODERATE TO STRENUOUS EXERCISE (LIKE A BRISK WALK)?: 2 DAYS

## 2025-08-12 SDOH — ECONOMIC STABILITY: FOOD INSECURITY: WITHIN THE PAST 12 MONTHS, THE FOOD YOU BOUGHT JUST DIDN'T LAST AND YOU DIDN'T HAVE MONEY TO GET MORE.: NEVER TRUE

## 2025-08-12 SDOH — ECONOMIC STABILITY: HOUSING INSECURITY
IN THE LAST 12 MONTHS, WAS THERE A TIME WHEN YOU DID NOT HAVE A STEADY PLACE TO SLEEP OR SLEPT IN A SHELTER (INCLUDING NOW)?: NO

## 2025-08-12 SDOH — ECONOMIC STABILITY: TRANSPORTATION INSECURITY
IN THE PAST 12 MONTHS, HAS THE LACK OF TRANSPORTATION KEPT YOU FROM MEDICAL APPOINTMENTS OR FROM GETTING MEDICATIONS?: NO

## 2025-08-12 SDOH — ECONOMIC STABILITY: INCOME INSECURITY: HOW HARD IS IT FOR YOU TO PAY FOR THE VERY BASICS LIKE FOOD, HOUSING, MEDICAL CARE, AND HEATING?: PATIENT DECLINED

## 2025-08-12 SDOH — ECONOMIC STABILITY: TRANSPORTATION INSECURITY
IN THE PAST 12 MONTHS, HAS LACK OF RELIABLE TRANSPORTATION KEPT YOU FROM MEDICAL APPOINTMENTS, MEETINGS, WORK OR FROM GETTING THINGS NEEDED FOR DAILY LIVING?: NO

## 2025-08-12 SDOH — HEALTH STABILITY: MENTAL HEALTH
STRESS IS WHEN SOMEONE FEELS TENSE, NERVOUS, ANXIOUS, OR CAN'T SLEEP AT NIGHT BECAUSE THEIR MIND IS TROUBLED. HOW STRESSED ARE YOU?: NOT AT ALL

## 2025-08-12 SDOH — ECONOMIC STABILITY: TRANSPORTATION INSECURITY
IN THE PAST 12 MONTHS, HAS LACK OF TRANSPORTATION KEPT YOU FROM MEETINGS, WORK, OR FROM GETTING THINGS NEEDED FOR DAILY LIVING?: NO

## 2025-08-12 ASSESSMENT — SOCIAL DETERMINANTS OF HEALTH (SDOH)
DO YOU BELONG TO ANY CLUBS OR ORGANIZATIONS SUCH AS CHURCH GROUPS UNIONS, FRATERNAL OR ATHLETIC GROUPS, OR SCHOOL GROUPS?: NO
HOW HARD IS IT FOR YOU TO PAY FOR THE VERY BASICS LIKE FOOD, HOUSING, MEDICAL CARE, AND HEATING?: PATIENT DECLINED
HOW MANY DRINKS CONTAINING ALCOHOL DO YOU HAVE ON A TYPICAL DAY WHEN YOU ARE DRINKING: 3 OR 4
DO YOU BELONG TO ANY CLUBS OR ORGANIZATIONS SUCH AS CHURCH GROUPS UNIONS, FRATERNAL OR ATHLETIC GROUPS, OR SCHOOL GROUPS?: NO
HOW OFTEN DO YOU GET TOGETHER WITH FRIENDS OR RELATIVES?: MORE THAN THREE TIMES A WEEK
HOW OFTEN DO YOU HAVE A DRINK CONTAINING ALCOHOL: 4 OR MORE TIMES A WEEK
HOW OFTEN DO YOU ATTEND CHURCH OR RELIGIOUS SERVICES?: MORE THAN 4 TIMES PER YEAR
IN A TYPICAL WEEK, HOW MANY TIMES DO YOU TALK ON THE PHONE WITH FAMILY, FRIENDS, OR NEIGHBORS?: MORE THAN THREE TIMES A WEEK
HOW OFTEN DO YOU ATTEND CHURCH OR RELIGIOUS SERVICES?: MORE THAN 4 TIMES PER YEAR
IN A TYPICAL WEEK, HOW MANY TIMES DO YOU TALK ON THE PHONE WITH FAMILY, FRIENDS, OR NEIGHBORS?: MORE THAN THREE TIMES A WEEK
HOW OFTEN DO YOU GET TOGETHER WITH FRIENDS OR RELATIVES?: MORE THAN THREE TIMES A WEEK
HOW OFTEN DO YOU HAVE SIX OR MORE DRINKS ON ONE OCCASION: LESS THAN MONTHLY
IN THE PAST 12 MONTHS, HAS THE ELECTRIC, GAS, OIL, OR WATER COMPANY THREATENED TO SHUT OFF SERVICE IN YOUR HOME?: NO
WITHIN THE PAST 12 MONTHS, YOU WORRIED THAT YOUR FOOD WOULD RUN OUT BEFORE YOU GOT THE MONEY TO BUY MORE: NEVER TRUE

## 2025-08-12 ASSESSMENT — LIFESTYLE VARIABLES
SKIP TO QUESTIONS 9-10: 0
HOW OFTEN DO YOU HAVE A DRINK CONTAINING ALCOHOL: 4 OR MORE TIMES A WEEK
HOW MANY STANDARD DRINKS CONTAINING ALCOHOL DO YOU HAVE ON A TYPICAL DAY: 3 OR 4
AUDIT-C TOTAL SCORE: 6
HOW OFTEN DO YOU HAVE SIX OR MORE DRINKS ON ONE OCCASION: LESS THAN MONTHLY

## 2025-08-13 ENCOUNTER — OFFICE VISIT (OUTPATIENT)
Dept: MEDICAL GROUP | Facility: MEDICAL CENTER | Age: 65
End: 2025-08-13
Payer: MEDICARE

## 2025-08-13 VITALS
DIASTOLIC BLOOD PRESSURE: 66 MMHG | HEIGHT: 68 IN | HEART RATE: 96 BPM | OXYGEN SATURATION: 98 % | BODY MASS INDEX: 31.81 KG/M2 | TEMPERATURE: 98.3 F | WEIGHT: 209.88 LBS | SYSTOLIC BLOOD PRESSURE: 122 MMHG

## 2025-08-13 DIAGNOSIS — I10 ESSENTIAL HYPERTENSION: ICD-10-CM

## 2025-08-13 DIAGNOSIS — Z11.59 NEED FOR HEPATITIS C SCREENING TEST: ICD-10-CM

## 2025-08-13 DIAGNOSIS — Z12.11 SCREEN FOR COLON CANCER: ICD-10-CM

## 2025-08-13 DIAGNOSIS — Z23 NEED FOR VACCINATION: ICD-10-CM

## 2025-08-13 DIAGNOSIS — Z98.890 HISTORY OF RIGHT-SIDED CAROTID ENDARTERECTOMY: ICD-10-CM

## 2025-08-13 DIAGNOSIS — I25.83 CORONARY ARTERY DISEASE DUE TO LIPID RICH PLAQUE: ICD-10-CM

## 2025-08-13 DIAGNOSIS — Z76.89 ENCOUNTER TO ESTABLISH CARE: ICD-10-CM

## 2025-08-13 DIAGNOSIS — I25.10 CORONARY ARTERY DISEASE DUE TO LIPID RICH PLAQUE: ICD-10-CM

## 2025-08-13 DIAGNOSIS — Z13.6 SCREENING FOR AAA (ABDOMINAL AORTIC ANEURYSM): ICD-10-CM

## 2025-08-13 DIAGNOSIS — E78.5 HYPERLIPIDEMIA, UNSPECIFIED HYPERLIPIDEMIA TYPE: Primary | ICD-10-CM

## 2025-08-13 DIAGNOSIS — Z12.5 SCREENING FOR PROSTATE CANCER: ICD-10-CM

## 2025-08-13 DIAGNOSIS — Z11.4 SCREENING FOR HIV (HUMAN IMMUNODEFICIENCY VIRUS): ICD-10-CM

## 2025-08-13 PROCEDURE — 90677 PCV20 VACCINE IM: CPT | Performed by: NURSE PRACTITIONER

## 2025-08-13 PROCEDURE — G0009 ADMIN PNEUMOCOCCAL VACCINE: HCPCS | Performed by: NURSE PRACTITIONER

## 2025-08-13 PROCEDURE — 3078F DIAST BP <80 MM HG: CPT | Performed by: NURSE PRACTITIONER

## 2025-08-13 PROCEDURE — 90472 IMMUNIZATION ADMIN EACH ADD: CPT | Performed by: NURSE PRACTITIONER

## 2025-08-13 PROCEDURE — 90715 TDAP VACCINE 7 YRS/> IM: CPT | Performed by: NURSE PRACTITIONER

## 2025-08-13 PROCEDURE — 3074F SYST BP LT 130 MM HG: CPT | Performed by: NURSE PRACTITIONER

## 2025-08-13 PROCEDURE — 99204 OFFICE O/P NEW MOD 45 MIN: CPT | Mod: 25 | Performed by: NURSE PRACTITIONER

## 2025-08-13 RX ORDER — TELMISARTAN 80 MG/1
80 TABLET ORAL DAILY
Qty: 100 TABLET | Refills: 0 | Status: SHIPPED | OUTPATIENT
Start: 2025-08-13

## 2025-08-13 ASSESSMENT — ENCOUNTER SYMPTOMS
HEADACHES: 0
WEIGHT LOSS: 0
EYE REDNESS: 0
SORE THROAT: 0
POLYDIPSIA: 0
COUGH: 0
NERVOUS/ANXIOUS: 0
BRUISES/BLEEDS EASILY: 0
SENSORY CHANGE: 0
SHORTNESS OF BREATH: 0
WHEEZING: 0
FOCAL WEAKNESS: 0
CHILLS: 0
ABDOMINAL PAIN: 0
FEVER: 0
DEPRESSION: 0
LOSS OF CONSCIOUSNESS: 0
SPEECH CHANGE: 0
SINUS PAIN: 0
EYE DISCHARGE: 0
NAUSEA: 0
VOMITING: 0
BACK PAIN: 0
TREMORS: 0
WEAKNESS: 0
EYE PAIN: 0
FLANK PAIN: 0
MYALGIAS: 0
PALPITATIONS: 0
SPUTUM PRODUCTION: 0
CONSTIPATION: 0
DIARRHEA: 0
INSOMNIA: 0
BLOOD IN STOOL: 0
DIZZINESS: 0

## 2025-08-13 ASSESSMENT — FIBROSIS 4 INDEX: FIB4 SCORE: 1.44
